# Patient Record
Sex: MALE | Race: BLACK OR AFRICAN AMERICAN | NOT HISPANIC OR LATINO | URBAN - METROPOLITAN AREA
[De-identification: names, ages, dates, MRNs, and addresses within clinical notes are randomized per-mention and may not be internally consistent; named-entity substitution may affect disease eponyms.]

---

## 2017-03-26 ENCOUNTER — EMERGENCY (EMERGENCY)
Facility: HOSPITAL | Age: 52
LOS: 1 days | Discharge: HOME | End: 2017-03-26
Admitting: EMERGENCY MEDICINE
Payer: MEDICARE

## 2017-03-26 VITALS
TEMPERATURE: 99 F | OXYGEN SATURATION: 100 % | SYSTOLIC BLOOD PRESSURE: 116 MMHG | RESPIRATION RATE: 18 BRPM | HEART RATE: 88 BPM | DIASTOLIC BLOOD PRESSURE: 73 MMHG

## 2017-03-26 DIAGNOSIS — R69 ILLNESS, UNSPECIFIED: ICD-10-CM

## 2017-03-26 DIAGNOSIS — F20.0 PARANOID SCHIZOPHRENIA: ICD-10-CM

## 2017-03-26 LAB
ALBUMIN SERPL ELPH-MCNC: 4 G/DL — SIGNIFICANT CHANGE UP (ref 3.3–5)
ALP SERPL-CCNC: 61 U/L — SIGNIFICANT CHANGE UP (ref 40–120)
ALT FLD-CCNC: 18 U/L — SIGNIFICANT CHANGE UP (ref 4–41)
AMPHET UR-MCNC: NEGATIVE — SIGNIFICANT CHANGE UP
APAP SERPL-MCNC: < 15 UG/ML — LOW (ref 15–25)
APPEARANCE UR: CLEAR — SIGNIFICANT CHANGE UP
AST SERPL-CCNC: 32 U/L — SIGNIFICANT CHANGE UP (ref 4–40)
BARBITURATES MEASUREMENT: NEGATIVE — SIGNIFICANT CHANGE UP
BARBITURATES UR SCN-MCNC: NEGATIVE — SIGNIFICANT CHANGE UP
BASOPHILS # BLD AUTO: 0.01 K/UL — SIGNIFICANT CHANGE UP (ref 0–0.2)
BASOPHILS NFR BLD AUTO: 0.2 % — SIGNIFICANT CHANGE UP (ref 0–2)
BENZODIAZ SERPL-MCNC: NEGATIVE — SIGNIFICANT CHANGE UP
BENZODIAZ UR-MCNC: NEGATIVE — SIGNIFICANT CHANGE UP
BILIRUB SERPL-MCNC: < 0.2 MG/DL — LOW (ref 0.2–1.2)
BILIRUB UR-MCNC: NEGATIVE — SIGNIFICANT CHANGE UP
BLOOD UR QL VISUAL: NEGATIVE — SIGNIFICANT CHANGE UP
BUN SERPL-MCNC: 14 MG/DL — SIGNIFICANT CHANGE UP (ref 7–23)
CALCIUM SERPL-MCNC: 9.2 MG/DL — SIGNIFICANT CHANGE UP (ref 8.4–10.5)
CANNABINOIDS UR-MCNC: POSITIVE — SIGNIFICANT CHANGE UP
CHLORIDE SERPL-SCNC: 99 MMOL/L — SIGNIFICANT CHANGE UP (ref 98–107)
CO2 SERPL-SCNC: 23 MMOL/L — SIGNIFICANT CHANGE UP (ref 22–31)
COCAINE METAB.OTHER UR-MCNC: NEGATIVE — SIGNIFICANT CHANGE UP
COLOR SPEC: SIGNIFICANT CHANGE UP
CREAT SERPL-MCNC: 1.18 MG/DL — SIGNIFICANT CHANGE UP (ref 0.5–1.3)
EOSINOPHIL # BLD AUTO: 0.14 K/UL — SIGNIFICANT CHANGE UP (ref 0–0.5)
EOSINOPHIL NFR BLD AUTO: 2.6 % — SIGNIFICANT CHANGE UP (ref 0–6)
ETHANOL BLD-MCNC: < 10 MG/DL — SIGNIFICANT CHANGE UP
GLUCOSE SERPL-MCNC: 106 MG/DL — HIGH (ref 70–99)
GLUCOSE UR-MCNC: NEGATIVE — SIGNIFICANT CHANGE UP
HCT VFR BLD CALC: 37.6 % — LOW (ref 39–50)
HGB BLD-MCNC: 13 G/DL — SIGNIFICANT CHANGE UP (ref 13–17)
IMM GRANULOCYTES NFR BLD AUTO: 0.2 % — SIGNIFICANT CHANGE UP (ref 0–1.5)
KETONES UR-MCNC: NEGATIVE — SIGNIFICANT CHANGE UP
LEUKOCYTE ESTERASE UR-ACNC: NEGATIVE — SIGNIFICANT CHANGE UP
LITHIUM SERPL-MCNC: < 0.04 MMOL/L — LOW (ref 0.6–1.2)
LYMPHOCYTES # BLD AUTO: 1.17 K/UL — SIGNIFICANT CHANGE UP (ref 1–3.3)
LYMPHOCYTES # BLD AUTO: 21.8 % — SIGNIFICANT CHANGE UP (ref 13–44)
MCHC RBC-ENTMCNC: 31.4 PG — SIGNIFICANT CHANGE UP (ref 27–34)
MCHC RBC-ENTMCNC: 34.6 % — SIGNIFICANT CHANGE UP (ref 32–36)
MCV RBC AUTO: 90.8 FL — SIGNIFICANT CHANGE UP (ref 80–100)
METHADONE UR-MCNC: NEGATIVE — SIGNIFICANT CHANGE UP
MONOCYTES # BLD AUTO: 0.24 K/UL — SIGNIFICANT CHANGE UP (ref 0–0.9)
MONOCYTES NFR BLD AUTO: 4.5 % — SIGNIFICANT CHANGE UP (ref 2–14)
NEUTROPHILS # BLD AUTO: 3.8 K/UL — SIGNIFICANT CHANGE UP (ref 1.8–7.4)
NEUTROPHILS NFR BLD AUTO: 70.7 % — SIGNIFICANT CHANGE UP (ref 43–77)
NITRITE UR-MCNC: NEGATIVE — SIGNIFICANT CHANGE UP
OPIATES UR-MCNC: NEGATIVE — SIGNIFICANT CHANGE UP
OXYCODONE UR-MCNC: NEGATIVE — SIGNIFICANT CHANGE UP
PCP UR-MCNC: NEGATIVE — SIGNIFICANT CHANGE UP
PH UR: 6.5 — SIGNIFICANT CHANGE UP (ref 4.6–8)
PLATELET # BLD AUTO: 267 K/UL — SIGNIFICANT CHANGE UP (ref 150–400)
PMV BLD: 9.5 FL — SIGNIFICANT CHANGE UP (ref 7–13)
POTASSIUM SERPL-MCNC: 4.5 MMOL/L — SIGNIFICANT CHANGE UP (ref 3.5–5.3)
POTASSIUM SERPL-SCNC: 4.5 MMOL/L — SIGNIFICANT CHANGE UP (ref 3.5–5.3)
PROT SERPL-MCNC: 6.9 G/DL — SIGNIFICANT CHANGE UP (ref 6–8.3)
PROT UR-MCNC: NEGATIVE — SIGNIFICANT CHANGE UP
RBC # BLD: 4.14 M/UL — LOW (ref 4.2–5.8)
RBC # FLD: 13.7 % — SIGNIFICANT CHANGE UP (ref 10.3–14.5)
RBC CASTS # UR COMP ASSIST: SIGNIFICANT CHANGE UP (ref 0–?)
SALICYLATES SERPL-MCNC: < 5 MG/DL — LOW (ref 15–30)
SODIUM SERPL-SCNC: 138 MMOL/L — SIGNIFICANT CHANGE UP (ref 135–145)
SP GR SPEC: 1.01 — SIGNIFICANT CHANGE UP (ref 1–1.03)
SQUAMOUS # UR AUTO: SIGNIFICANT CHANGE UP
TSH SERPL-MCNC: 0.39 UIU/ML — SIGNIFICANT CHANGE UP (ref 0.27–4.2)
UROBILINOGEN FLD QL: NORMAL E.U. — SIGNIFICANT CHANGE UP (ref 0.1–0.2)
WBC # BLD: 5.37 K/UL — SIGNIFICANT CHANGE UP (ref 3.8–10.5)
WBC # FLD AUTO: 5.37 K/UL — SIGNIFICANT CHANGE UP (ref 3.8–10.5)
WBC UR QL: SIGNIFICANT CHANGE UP (ref 0–?)

## 2017-03-26 PROCEDURE — 90792 PSYCH DIAG EVAL W/MED SRVCS: CPT

## 2017-03-26 PROCEDURE — 99284 EMERGENCY DEPT VISIT MOD MDM: CPT

## 2017-03-26 RX ORDER — IBUPROFEN 200 MG
400 TABLET ORAL ONCE
Qty: 0 | Refills: 0 | Status: COMPLETED | OUTPATIENT
Start: 2017-03-26 | End: 2017-03-26

## 2017-03-26 RX ORDER — BENZTROPINE MESYLATE 1 MG
1 TABLET ORAL ONCE
Qty: 0 | Refills: 0 | Status: COMPLETED | OUTPATIENT
Start: 2017-03-26 | End: 2017-03-26

## 2017-03-26 RX ADMIN — Medication 1 MILLIGRAM(S): at 22:37

## 2017-03-26 RX ADMIN — Medication 400 MILLIGRAM(S): at 20:17

## 2017-03-26 RX ADMIN — Medication 400 MILLIGRAM(S): at 20:24

## 2017-03-26 NOTE — ED BEHAVIORAL HEALTH ASSESSMENT NOTE - DESCRIPTION
Patient was calm and cooperative in the ED and did not exhibit any aggression. Patient did not require any PRN medications or any physical restraints. Hypertension HPI Patient was calm and cooperative in the ED and did not exhibit any aggression. Patient did not require any physical restraints.

## 2017-03-26 NOTE — ED ADULT NURSE REASSESSMENT NOTE - NS ED NURSE REASSESS COMMENT FT1
pt remains awake, a&ox3, appears slightly anxious and states "I need my psych meds, I feel weird". NP Sania made aware, pt ordered to received STAT Cogentin 1mg PO. Pending psychiatric disposition, will continue to monitor for safety and therapeutic effect.

## 2017-03-26 NOTE — ED PROVIDER NOTE - OBJECTIVE STATEMENT
52 y/o M hx DM,HTN, Anxiety Disorder, Paranoid Schizophrenia BIBA w c/o agitation and  mood  swings stating " I need to change my medication". States that he as thrown up twice today and strongly believes that  he might have tape worms. Presently denies N/V at presents.  Denies falling.  Denies punching or   kicking any objects.  Denies pain,   SOB, fever, chills, chest/abdominal discomfort.  Denies SI/HI/AH/VH. Denies recent use of alcohol or illicit drugs.

## 2017-03-26 NOTE — ED BEHAVIORAL HEALTH ASSESSMENT NOTE - HPI (INCLUDE ILLNESS QUALITY, SEVERITY, DURATION, TIMING, CONTEXT, MODIFYING FACTORS, ASSOCIATED SIGNS AND SYMPTOMS)
Patient is a 51 year old single  male, with no caregiver role, domiciled with peers at \[facility]\"" supportive housing, with PPDx of Schizophrenia, paranoid type, with prior psychiatric hospitalization (last: years ago), with no prior self-injurious behavior, with no prior suicidality with intent/plan, with no prior suicide attempt, with occassional marijuana use, with no prior withdrawals/seizures/DTs/complications, with no prior substance use treatment, with prior legal history (felony), with no prior physical/sexual abuse, with no access to guns, with PMHx significant for hypertension, with current treatment at [facility], was was self-referred for medication change. Patient is a 51 year old single  male, with no caregiver role, domiciled with peers at [facility] supportive housing, with PPDx of Schizophrenia, paranoid type, with prior psychiatric hospitalization (last: years ago), with no prior self-injurious behavior, with no prior suicidality with intent/plan, with no prior suicide attempt, with occassional marijuana use, with no prior withdrawals/seizures/DTs/complications, with no prior substance use treatment, with prior legal history (felony), with no prior physical/sexual abuse, with no access to guns, with PMHx significant for hypertension, with current treatment at [facility], was was self-referred for medication change.    Patient presents organized, however with impaired reasoning, stating to have came to ED because he feels his medications are not working as he has become "immune to them." Patient complaining of EPS like symptoms, reporting psychomotor restlessness. Reports irritability and feeling on edge. however patient is not agitated. Reports difficulty sleeping. Denies manic symptoms including elevated mood, mood lability, distractibility, grandiosity, pressured speech, increase in goal-directed activity, or decreased need for sleep. Denies psychotic symptoms including paranoia, ideas of reference, thought insertion/broadcasting, or auditory/visual/olfactory/tactile/gustatory hallucinations. Denies anxiety/depression. Denies suicidal ideation/intent/plan. Denies homicidal ideation/intent/plan. Denies assault ideation/intent/plan. Reports medication compliance. Reports positive therapeutic relationships and strong social supports. Reports future orientation, with motivation to continue outpatient treatment. Reports having appointment with psychiatrist tomorrow at [facility]. Reports wanting a medication to address his symptoms as reported above. Patient denied being on cogentin. Patient given Cogentin 1 mg in ED.     Collateral was unavailable. Patient is a 51 year old single  male, with no caregiver role, domiciled with peers at [facility] supportive housing, with PPDx of Schizophrenia, paranoid type, with prior psychiatric hospitalization (last: years ago), with no prior self-injurious behavior, with no prior suicidality with intent/plan, with no prior suicide attempt, with occasional marijuana use, with no prior withdrawals/seizures/DTs/complications, with no prior substance use treatment, with prior legal history (felony), with no prior physical/sexual abuse, with no access to guns, with PMHx significant for hypertension, with current treatment at [facility], was self-referred for medication change.    Patient presents organized, however with impaired reasoning, stating to have came to ED because he feels his medications are not working as he has become "immune to them." Patient complaining of EPS like symptoms, reporting psychomotor restlessness. Reports irritability and feeling on edge. however patient is not agitated. Reports difficulty sleeping. Denies manic symptoms including elevated mood, mood lability, distractibility, grandiosity, pressured speech, increase in goal-directed activity, or decreased need for sleep. Denies psychotic symptoms including paranoia, ideas of reference, thought insertion/broadcasting, or auditory/visual/olfactory/tactile/gustatory hallucinations. Denies anxiety/depression. Denies suicidal ideation/intent/plan. Denies homicidal ideation/intent/plan. Denies assault ideation/intent/plan. Reports medication compliance. Reports positive therapeutic relationships and strong social supports. Reports future orientation, with motivation to continue outpatient treatment. Reports having appointment with psychiatrist tomorrow at [facility]. Reports wanting a medication to address his symptoms as reported above. Patient denied being on cogentin. Patient given Cogentin 1 mg in ED.     Collateral was unavailable.

## 2017-03-26 NOTE — ED BEHAVIORAL HEALTH ASSESSMENT NOTE - SUMMARY
Patient is a 51 year old single  male, with no caregiver role, domiciled with peers at [facility] supportive housing, with PPDx of Schizophrenia, paranoid type, with prior psychiatric hospitalization (last: years ago), with no prior self-injurious behavior, with no prior suicidality with intent/plan, with no prior suicide attempt, with occassional marijuana use, with no prior withdrawals/seizures/DTs/complications, with no prior substance use treatment, with prior legal history (felony), with no prior physical/sexual abuse, with no access to guns, with PMHx significant for hypertension, with current treatment at [facility], was was self-referred for medication change.    Patient complaining of his medications are not working along with EPS-like symptoms, reporting psychomotor restlessness. Patient reports irritability and feeling on edge, however patient is not agitated. Reports difficulty sleeping. Patient denying other manic symptoms, including  psychosis, depression and anxiety. Patient denied being on cogentin, which if it is true, may be contributing to his side effect experience. Patient given cogentin in ED. Patient denying suicidality/homicidality. Patient is due for Haldol Decanoate tomorrow. Patient is not presenting as an imminent risk for harm to self, and does not meet criteria for involuntary in-patient hospitalization. Patient and mother agreeable to discharge plan, and engaged in safety planning. Patient to follow-up with out-patient provider at [facility] tomorrow. Patient is a 51 year old single  male, with no caregiver role, domiciled with peers at [facility] supportive housing, with PPDx of Schizophrenia, paranoid type, with prior psychiatric hospitalization (last: years ago), with no prior self-injurious behavior, with no prior suicidality with intent/plan, with no prior suicide attempt, with occasional marijuana use, with no prior withdrawals/seizures/DTs/complications, with no prior substance use treatment, with prior legal history (felony), with no prior physical/sexual abuse, with no access to guns, with PMHx significant for hypertension, with current treatment at [facility], was self-referred for medication change.    Patient complaining of his medications are not working along with EPS-like symptoms, reporting psychomotor restlessness. Patient reports irritability and feeling on edge, however patient is not agitated. Reports difficulty sleeping. Patient denying other manic symptoms, including  psychosis, depression and anxiety. Patient denied being on cogentin, which if it is true, may be contributing to his side effect experience. Patient given cogentin in ED. Patient denying suicidality/homicidality. Patient is due for Haldol Decanoate tomorrow. Patient is not presenting as an imminent risk for harm to self, and does not meet criteria for involuntary in-patient hospitalization. Patient and mother agreeable to discharge plan, and engaged in safety planning. Patient to follow-up with out-patient provider at [facility] tomorrow.

## 2017-03-26 NOTE — ED PROVIDER NOTE - MEDICAL DECISION MAKING DETAILS
52 y/o M hx DM,HTN, Anxiety Disorder, Paranoid Schizophrenia  Labs, Urine Tox/UA, EKG. No evidence of physical injuries, broken skin or deformities. PO challenge. Tolerated full meal well. No c/o abdominal discomfort /N/V.   Medical evaluation performed. There is no clinical evidence of intoxication or any acute medical problem requiring immediate intervention. Patient is awaiting psychiatric consultation. Final disposition will be determined by psychiatrist. 50 y/o M hx DM,HTN, Anxiety Disorder, Paranoid Schizophrenia  Labs, Urine Tox/UA, EKG. No evidence of physical injuries, broken skin or deformities. PO challenge. Tolerated full meal well. No c/o abdominal discomfort /N/V.   Medical evaluation performed. There is no clinical evidence of intoxication or any acute medical problem requiring immediate intervention. Patient is awaiting psychiatric consultation. Final disposition will be determined by psychiatrist..

## 2017-03-26 NOTE — ED BEHAVIORAL HEALTH ASSESSMENT NOTE - REFERRAL / APPOINTMENT DETAILS
Patient to follow-up with outpatient psychiatrist as per scheduled appointment tomorrow at [facility]

## 2017-03-26 NOTE — ED BEHAVIORAL HEALTH ASSESSMENT NOTE - RISK ASSESSMENT
Patient presents as a low risk for harm to self/others, with risk factors including history of violence / legal problems, history of schizophrenia, paranoid type, and difficulty sleeping, of which are outweighed by significant protective factors, including no previous suicide attempts, no access to firearms, positive therapeutic relationships, supportive family and social supports, willingness to seek help, no suicidal/homicidal ideations intent or plans, hopefulness for future, ability to cope with stress,  frustration tolerance, engaging in discharge and safety planning, motivation to participate in outpatient treatment.

## 2017-03-26 NOTE — ED ADULT TRIAGE NOTE - CHIEF COMPLAINT QUOTE
Patient brought from home by EMS for headaches for three days. He is concerned that his antipsychotic meds are not working properly and that it is causing him tremors and headaches.

## 2017-03-26 NOTE — ED BEHAVIORAL HEALTH ASSESSMENT NOTE - DETAILS
See HPI for additional information EPS complaining of headache low psychomotor restlessness Patient present

## 2017-03-26 NOTE — ED BEHAVIORAL HEALTH ASSESSMENT NOTE - SUICIDE PROTECTIVE FACTORS
Future oriented/Supportive social network or family/Positive therapeutic relationships/Responsibility to family and others

## 2017-03-26 NOTE — ED BEHAVIORAL HEALTH ASSESSMENT NOTE - CASE SUMMARY
pt seen and examined. In summary this a 51 year old single  male, with no caregiver role, domiciled with peers at [facility] supportive housing, with PPDx of Schizophrenia, paranoid type, with prior psychiatric hospitalization (last: years ago), with no prior self-injurious behavior, with no prior suicidality with intent/plan, with no prior suicide attempt, with occasional marijuana use, with no prior withdrawals/seizures/DTs/complications, with no prior substance use treatment, with prior legal history (felony), with no prior physical/sexual abuse, with no access to guns, with PMHx significant for hypertension, with current treatment at [facility], was self-referred for medication change. On evaluation the pt is calm and cooperative. he denies acute mood or psychotic symtpoms. he reports having followup tomorrow with his psychiatrist. He reports that he his tremor is worse and would like some medication. pt was given cogentin with good effect and instructed to follow up with his psychiatrist. In my medical opinion the pt is not an acute risk of harm to self or others and does not meet criteria for psychiatric hospitalization.

## 2017-03-27 RX ADMIN — Medication 400 MILLIGRAM(S): at 00:17

## 2017-03-27 RX ADMIN — Medication 400 MILLIGRAM(S): at 00:43

## 2017-03-27 NOTE — ED ADULT NURSE REASSESSMENT NOTE - GENERAL PATIENT STATE
pt remains awake, denies s/i h/i with + effect from PO meds received/comfortable appearance/cooperative

## 2017-10-09 ENCOUNTER — EMERGENCY (EMERGENCY)
Facility: HOSPITAL | Age: 52
LOS: 1 days | Discharge: HOME | End: 2017-10-09
Attending: EMERGENCY MEDICINE | Admitting: EMERGENCY MEDICINE
Payer: MEDICARE

## 2017-10-09 VITALS
HEART RATE: 84 BPM | DIASTOLIC BLOOD PRESSURE: 75 MMHG | TEMPERATURE: 98 F | OXYGEN SATURATION: 100 % | RESPIRATION RATE: 16 BRPM | SYSTOLIC BLOOD PRESSURE: 128 MMHG

## 2017-10-09 PROCEDURE — 99283 EMERGENCY DEPT VISIT LOW MDM: CPT | Mod: 25

## 2017-10-09 PROCEDURE — 90792 PSYCH DIAG EVAL W/MED SRVCS: CPT | Mod: GC

## 2017-10-09 RX ORDER — DIPHENHYDRAMINE HCL 50 MG
50 CAPSULE ORAL ONCE
Qty: 0 | Refills: 0 | Status: COMPLETED | OUTPATIENT
Start: 2017-10-09 | End: 2017-10-09

## 2017-10-09 RX ORDER — LISINOPRIL 2.5 MG/1
10 TABLET ORAL DAILY
Qty: 0 | Refills: 0 | Status: DISCONTINUED | OUTPATIENT
Start: 2017-10-09 | End: 2017-10-13

## 2017-10-09 RX ORDER — HALOPERIDOL DECANOATE 100 MG/ML
10 INJECTION INTRAMUSCULAR ONCE
Qty: 0 | Refills: 0 | Status: COMPLETED | OUTPATIENT
Start: 2017-10-09 | End: 2017-10-09

## 2017-10-09 RX ORDER — METFORMIN HYDROCHLORIDE 850 MG/1
1000 TABLET ORAL ONCE
Qty: 0 | Refills: 0 | Status: COMPLETED | OUTPATIENT
Start: 2017-10-09 | End: 2017-10-09

## 2017-10-09 RX ORDER — HALOPERIDOL DECANOATE 100 MG/ML
5 INJECTION INTRAMUSCULAR ONCE
Qty: 0 | Refills: 0 | Status: COMPLETED | OUTPATIENT
Start: 2017-10-09 | End: 2017-10-09

## 2017-10-09 RX ADMIN — Medication 50 MILLIGRAM(S): at 08:31

## 2017-10-09 RX ADMIN — LISINOPRIL 10 MILLIGRAM(S): 2.5 TABLET ORAL at 08:31

## 2017-10-09 RX ADMIN — HALOPERIDOL DECANOATE 5 MILLIGRAM(S): 100 INJECTION INTRAMUSCULAR at 07:14

## 2017-10-09 RX ADMIN — METFORMIN HYDROCHLORIDE 1000 MILLIGRAM(S): 850 TABLET ORAL at 08:32

## 2017-10-09 RX ADMIN — HALOPERIDOL DECANOATE 10 MILLIGRAM(S): 100 INJECTION INTRAMUSCULAR at 08:31

## 2017-10-09 NOTE — ED PROVIDER NOTE - OBJECTIVE STATEMENT
50 yo with reported shizo not taking his meds for the last 3 days - reports needing help and hearing voices.  Denies any ingestions.  States he can get meds in 3 days.

## 2017-10-09 NOTE — ED BEHAVIORAL HEALTH ASSESSMENT NOTE - REFERRAL / APPOINTMENT DETAILS
Patient to follow-up with outpatient psychiatrist as per scheduled appointment tomorrow at [facility] Patient to follow-up with outpatient psychiatrist as per scheduled appointment

## 2017-10-09 NOTE — ED ADULT NURSE REASSESSMENT NOTE - NS ED NURSE REASSESS COMMENT FT1
Received pt in  pt restless denies Si/Hi/AVh at present eval on going. Received report from RN ARSALAN pt restless c/o insomnia x several months pt denies Si/Hi/AVh at present eval on going.

## 2017-10-09 NOTE — ED ADULT TRIAGE NOTE - CHIEF COMPLAINT QUOTE
Pt. from home with c/o hearing voices" chattering in my ear" stated my mind is racing and unable to sleep. Denies SI. stated he ran out of medications.

## 2017-10-09 NOTE — ED BEHAVIORAL HEALTH ASSESSMENT NOTE - SAFETY PLAN DETAILS
Patient instructed to return to the ED or call 911 if patient experiences SI, HI, hopelessness, worsening of symptoms or has any other concerns. Patient instructed to return to the ED or call 911 if patient experiences SI, HI, hopelessness, worsening of symptoms or has any other acute concerns.

## 2017-10-09 NOTE — ED ADULT NURSE NOTE - OBJECTIVE STATEMENT
Pt received to #2. Pt states he ran out of his Rx for Haldol, Ambien and Klonopin and is now experiencing auditory hallucinations of some unknown person chatting in his ear. Pt denies SI/HI. Pt extremely fidgety, pacing back and forth. Psych Attending made aware and medication administered as ordered. Pt belongings secured for safety. Pt awaiting psychiatric evaluation.

## 2017-10-09 NOTE — ED BEHAVIORAL HEALTH ASSESSMENT NOTE - DESCRIPTION
Patient was initially agitated and was given Haldol 5 po PRN at 7:14am with moderate effect. He then received his home medications at 8:31am (Haldol po 10, Benadryl po 50 with good effect, and his home anti-HTN and DM2 medications. Hypertension HPI Patient was initially agitated and was given Haldol 5 po PRN at 7:14am with moderate effect. He then received Haldol po 10 (home medication), Benadryl po 50 with good effect, and his home anti-HTN and DM2 medications.

## 2017-10-09 NOTE — ED BEHAVIORAL HEALTH ASSESSMENT NOTE - DETAILS
Patient present Pt did not provide details but says he had one suicide attempt when he was very young 1 arrest for assault in past EPS low psychomotor restlessness complaining of headache self-referred

## 2017-10-09 NOTE — ED BEHAVIORAL HEALTH ASSESSMENT NOTE - CASE SUMMARY
52 y/o male with history of Schizophrenia, daily marijuana use, self-referred to  ED as he ran out of medications.   Patient complaining of worsening symptoms (AH, depression, insomnia, and racing thoughts) secondary to running out of medications. He received his Haldol Decanoate on 10/5, but was unable to fill prescription for Klonopin and po Haldol due to issues with copay. Patient denying suicidality/homicidality. Patient is not presenting as an imminent risk for harm to self, and does not meet criteria for involuntary in-patient hospitalization. Patient was unable to fill prescription for Klonopin and Haldol because it was sent to pharmacy which was charging him a co-pay. Patient's  spoke to psychiatrist who is going to send medication to new pharmacy, which will be delivered to patient's apartment. This was communicated to patient, who will stay home until his medication is delivered and will call his  when he is discharged. Patient agreeable to discharge plan, and engaged in safety planning. Patient will follow up with his outpatient psychiatrist at his regularly scheduled appointment.

## 2017-10-09 NOTE — ED BEHAVIORAL HEALTH ASSESSMENT NOTE - RISK ASSESSMENT
Patient presents as a low risk for harm to self/others, with risk factors including history of violence / legal problems, history of schizophrenia, paranoid type, and difficulty sleeping, of which are outweighed by significant protective factors, including no previous suicide attempts, no access to firearms, positive therapeutic relationships, supportive family and social supports, willingness to seek help, no suicidal/homicidal ideations intent or plans, hopefulness for future, ability to cope with stress,  frustration tolerance, engaging in discharge and safety planning, motivation to participate in outpatient treatment. Patient presents as a low risk for harm to self/others, with risk factors including history of violence / legal problems, history of schizophrenia, and insomnia, of which are outweighed by significant protective factors, including no S/H/I/I/P, no access to firearms, positive therapeutic relationships, supportive family and social supports, willingness to seek help, no hopefulness for future, future orientation, engaging in discharge and safety planning, motivation to participate in outpatient treatment. Patient currently presents as a low risk for harm to self/others, with chronic risk factors including history of violence / legal problems, history of schizophrenia, and insomnia, of which are outweighed by significant protective factors, including no S/H/I/I/P, no access to firearms, positive therapeutic relationships, supportive family and social supports, willingness to seek help, no hopefulness for future, future orientation, engaging in discharge and safety planning, motivation to participate in outpatient treatment.

## 2017-10-09 NOTE — ED BEHAVIORAL HEALTH ASSESSMENT NOTE - MEDICATIONS (PRESCRIPTIONS, DIRECTIONS)
Patient may continue medications as prescribed by outpatient psychiatrist. Continue medications as prescribed by outpatient psychiatrist.

## 2017-10-09 NOTE — ED BEHAVIORAL HEALTH ASSESSMENT NOTE - SUMMARY
Patient is a 51 year old single  male, with no caregiver role, domiciled with peers at [facility] supportive housing, with PPDx of Schizophrenia, paranoid type, with prior psychiatric hospitalization (last: years ago), with no prior self-injurious behavior, with no prior suicidality with intent/plan, with no prior suicide attempt, with occasional marijuana use, with no prior withdrawals/seizures/DTs/complications, with no prior substance use treatment, with prior legal history (felony), with no prior physical/sexual abuse, with no access to guns, with PMHx significant for hypertension, with current treatment at [facility], was self-referred for medication change.    Patient complaining of his medications are not working along with EPS-like symptoms, reporting psychomotor restlessness. Patient reports irritability and feeling on edge, however patient is not agitated. Reports difficulty sleeping. Patient denying other manic symptoms, including  psychosis, depression and anxiety. Patient denied being on cogentin, which if it is true, may be contributing to his side effect experience. Patient given cogentin in ED. Patient denying suicidality/homicidality. Patient is due for Haldol Decanoate tomorrow. Patient is not presenting as an imminent risk for harm to self, and does not meet criteria for involuntary in-patient hospitalization. Patient and mother agreeable to discharge plan, and engaged in safety planning. Patient to follow-up with out-patient provider at [facility] tomorrow. Patient is a 51 year old single  male, no caregiver role, domiciled with roommate at [facility] supportive housing, with PPDx of Schizophrenia, paranoid type, with prior psychiatric hospitalization (last psych hospitalization "years ago"), with no prior self-injurious behavior, with1 prior suicide attempt "when I was very young", daily marijuana use, with no prior withdrawals/seizures/DTs/complications, with no prior substance use treatment, with prior legal history (felony for assault many years ago), with no prior physical/sexual abuse, with no access to guns, with PMHx significant for hypertension and DM2, with current treatment at [facility] (Dr. [phone]), was self-referred as he ran out of medications.     Patient complaining of worsening symptoms (AH, depression, insomnia, and racing thoughts) secondary to running out of medications. He received his Haldol Decanoate on 10/5, but was unable to fill prescription for Klonopin and po Haldol due to issues with copay. Patient denying suicidality/homicidality. Patient is not presenting as an imminent risk for harm to self, and does not meet criteria for involuntary in-patient hospitalization. Patient was unable to fill prescription for Klonopin and Haldol because it was sent to pharmacy which was charging him a copay. Patient's  spoke to MD who is going to send medication to new pharmacy, which patient can obtain today. This was communicated to patient, who will coordinate picking up the medications with his . Patient agreeable to discharge plan, and engaged in safety planning. Patient will follow up with his outpatient psychiatrist at his regularly scheduled appointment. Patient is a 51 year old single  male, no caregiver role, domiciled with roommate at [facility] supportive housing, with PPDx of Schizophrenia, paranoid type, with prior psychiatric hospitalization (last psych hospitalization "years ago"), with no prior self-injurious behavior, with 1 prior suicide attempt "when I was very young", daily marijuana use, with no prior withdrawals/seizures/DTs/complications, with no prior substance use treatment, with prior legal history (felony for assault many years ago), with no access to guns, with PMHx significant for hypertension and DM2, with current treatment at [facility] (Dr. Velasquez ), was self-referred as he ran out of medications.     Patient complaining of worsening symptoms (AH, depression, insomnia, and racing thoughts) secondary to running out of medications. He received his Haldol Decanoate on 10/5, but was unable to fill prescription for Klonopin and po Haldol due to issues with copay. Patient denying suicidality/homicidality. Patient is not presenting as an imminent risk for harm to self, and does not meet criteria for involuntary in-patient hospitalization. Patient was unable to fill prescription for Klonopin and Haldol because it was sent to pharmacy which was charging him a co-pay. Patient's  spoke to psychiatrist who is going to send medication to new pharmacy, which will be delivered to patient's apartment. This was communicated to patient, who will stay home until his medication is delivered and will call his  when he is discharged. Patient agreeable to discharge plan, and engaged in safety planning. Patient will follow up with his outpatient psychiatrist at his regularly scheduled appointment.

## 2017-10-09 NOTE — ED BEHAVIORAL HEALTH ASSESSMENT NOTE - HPI (INCLUDE ILLNESS QUALITY, SEVERITY, DURATION, TIMING, CONTEXT, MODIFYING FACTORS, ASSOCIATED SIGNS AND SYMPTOMS)
Patient is a 51 year old single  male, no caregiver role, domiciled with roommate at [facility] supportive housing, with PPDx of Schizophrenia, paranoid type, with prior psychiatric hospitalization (last psych hospitalization "years ago"), with no prior self-injurious behavior, with1 prior suicide attempt "when I was very young", daily marijuana use, with no prior withdrawals/seizures/DTs/complications, with no prior substance use treatment, with prior legal history (felony for assault many years ago), with no prior physical/sexual abuse, with no access to guns, with PMHx significant for hypertension and DM2, with current treatment at [facility] (Dr. [phone]), was self-referred as he ran out of medications.     Patient seen after receiving Haldol (his regular home dose and PRN) in the ED due to reported agitation. He reports that he ran out of medications on 10/5 and since this time his symptoms are worsening. He says that he goes to the psychiatrist listed above, and received his Haldol Dec 250mg IM on 10/5, but was unable to  his medications because of issues regarding a copay. He subsequently went to [facility] last week because he need a refill on medications, but was unable to get the medications at this time. He states that he is supposed to take Haldol 10mg daily, Klonopin 1mg, and Ambien at night and is currently requesting his klonopin because "it helps me sleep" (per ISTOP patient has not been getting ambien, but gets Rx of Klonopin 1mg po daily from Dr. Velasquez, last prescription 9/12 - 30 day supply). Currently he reports having racing thoughts, restlessness, auditory hallucinations (voices chattering constantly) worse than his baseline AH, denies VH with no evidence of delusions. He also reports feeling depression, with insomnia (says he hasn't slept in 3 days, and has difficulty sleeping at baseline), but he wants to sleep and feels tired. Denies suicidal ideation/intent/plan. Denies violent/homicidal ideation/intent/plan. Endorses increase in anxiety in past few days. Denies manic symptoms including elevated mood, mood lability, distractibility, grandiosity, pressured speech, increase in goal-directed activity, or decreased need for sleep. No access to weapons. Daily marijuana use, denies other substance use. The patient is future oriented, and wants to be in a day program as he wants more structure to his day and wants his treatment team to see him more frequently.     Collateral was obtained from patient's  at [facility]. Per Svitlana the patient is supposed to see the doctor soon and aside from the past week, he doesn't have a history of running out of medications and going to the ED. She is aware he went to [place] last week, unsure why he hasn't been able to fill his Rx. She thinks that maybe he has a new copay and cannot afford it. She will follow up with the MD. She says that Mr. Aguilar has been doing well recently, she doesn't have any concerns for his safety or for the safety of anyone else. He is compliant with medication in the community, and is functioning well. She is currently trying to get him into a day program. Svitlana is going to speak to the doctor's office and will call writer back. Patient is a 51 year old single  male, no caregiver role, domiciled with roommate at [facility] supportive housing, with PPDx of Schizophrenia, with prior psychiatric hospitalization (last psych hospitalization "years ago"), with no prior self-injurious behavior, with 1 prior suicide attempt "when I was very young", daily marijuana use, with no prior withdrawals/seizures/DTs/complications, with no prior substance use treatment, with prior legal history (felony for assault many years ago), with no access to guns, with PMHx significant for hypertension and DM2, with current treatment at [facility] (Dr. Velasquez ), was self-referred as he ran out of medications as he was unable to fill his prescription last week due to issues with copay.     Patient seen after receiving Haldol (his regular home dose and PRN) in the ED due to reported agitation. He reports that he ran out of medications on 10/5 and since this time his symptoms are worsening. He says that he goes to the psychiatrist listed above, and received his Haldol Dec 250mg IM on 10/5, but was unable to  his medications because of issues regarding a copay. He subsequently went to [facility] last week because he need a refill on medications, but was unable to get the medications at this time. He states that he is supposed to take Haldol 10mg daily, Klonopin 1mg, and Ambien at night and is currently requesting his klonopin because "it helps me sleep" (per ISTOP (reference [id number]) patient has not been getting ambien, but gets Rx of Klonopin 1mg po daily from Dr. Velasquez, last prescription 9/12 - 30 day supply). Currently he reports having racing thoughts, restlessness, auditory hallucinations (voices chattering constantly) worse than his baseline AH, denies VH with no evidence of delusions. He also reports feeling depression, with insomnia (says he hasn't slept in 3 days, and has difficulty sleeping at baseline), but he wants to sleep and feels tired. Denies suicidal ideation/intent/plan. Denies violent/homicidal ideation/intent/plan. Endorses increase in anxiety in past few days. Denies manic symptoms including elevated mood, mood lability, distractibility, grandiosity, pressured speech, increase in goal-directed activity, or decreased need for sleep. No access to weapons. Daily marijuana use, denies other substance use. The patient is future oriented, and wants to be in a day program as he wants more structure to his day and wants his treatment team to see him more frequently.     Collateral was obtained from patient's  at [facility] Svitlana . Per Svitlana the patient saw doctor last week (received his Haldol Dec). She says that Mr. Aguilar has been doing well recently, she doesn't have any concerns for his safety or for the safety of anyone else. He is compliant with medication in the community, and is functioning well. She is currently trying to get him into a day program. He doesn't have a history of running out of medications and going to the ED. She is aware he went to [facility] last week for the same reason. She says that the pharmacy he is using is now charging him a copay, so she spoke to the psychiatrist this morning and he will send Rx for Klonopin/Haldol to new pharmacy. The medications will be delivered to the patient's apartment today. Pt will follow up with his  today when he returns to his residence and will follow up with his psychiatrist per routine (next in 3 weeks) for his next Haldol Decanoate injection. Patient is a 51 year old single  male, no caregiver role, domiciled with roommate at [facility] supportive housing, with PPDx of Schizophrenia, with prior psychiatric hospitalization (last psych hospitalization "years ago"), with no prior self-injurious behavior, with 1 prior suicide attempt "when I was very young", daily marijuana use, with no prior withdrawals/seizures/DTs/complications, with no prior substance use treatment, with prior legal history (felony for assault many years ago), with no access to guns, with PMHx significant for hypertension and DM2, with current treatment at [facility] (Dr. Velasquez ), was self-referred as he ran out of medications as he was unable to fill his prescription last week due to issues with copay.     Patient seen after receiving Haldol (his regular home dose and PRN) in the ED due to reported agitation. He reports that he ran out of medications on 10/5 and since this time his symptoms are worsening. He says that he goes to the psychiatrist listed above, and received his Haldol Dec 250mg IM on 10/5, but was unable to  his medications because of issues regarding a copay at his pharmacy. He subsequently went to [facility] last week because he need a refill on medications, but was unable to get the medications at this time. He states that he is supposed to take Haldol 10mg daily, Klonopin 1mg, and Ambien at night and is currently requesting his klonopin because "it helps me sleep" (per ISTOP (reference [id number]) patient has not been getting ambien, but gets Rx of Klonopin 1mg po daily from Dr. Velasquez, last prescription 9/12 - 30 day supply). Currently he reports having racing thoughts, restlessness, auditory hallucinations (voices chattering constantly) worse than his baseline AH, denies VH with no evidence of delusions. He also reports depression, with insomnia (says he hasn't slept in 3 days, and has difficulty sleeping at baseline), but he wants to sleep and feels tired. Denies suicidal ideation/intent/plan. Denies violent/homicidal ideation/intent/plan. Endorses increase in anxiety in past few days. Denies manic symptoms including elevated mood, mood lability, distractibility, grandiosity, pressured speech, increase in goal-directed activity, or decreased need for sleep. No access to weapons. Daily marijuana use, denies other substance use. The patient is future oriented, and wants to be in a day program as he wants more structure to his day and wants his treatment team to see him more frequently.     Collateral was obtained from patient's  at [facility] Svitlana . Per Svitlana the patient saw doctor last week (received his Haldol Dec). She says that Mr. Aguilar has been doing well recently, she doesn't have any concerns for his safety or for the safety of anyone else. He is compliant with medication in the community, and is functioning well. She is currently trying to get him into a day program. He doesn't have a history of running out of medications and going to the ED aside from this week. She is aware he went to [facility] last week for meds. She says that the pharmacy he is using is now charging him a copay, so she spoke to the psychiatrist this morning and psychiatrist will send Rx for Klonopin/Haldol to new pharmacy who will deliver the medication to his apartment today. Pt is aware of this plan. Pt will follow up with his  today when he returns to his residence and will follow up with his psychiatrist per routine for his next Haldol Decanoate injection. Svitlana is also going to reach out to patient's PCP to make sure his metformin and lisinopril are being sent to correct pharmacy. Patient is a 51 year old single  male, no caregiver role, domiciled with roommate at [facility] supportive housing, with PPDx of Schizophrenia, with prior psychiatric hospitalizations (last psych hospitalization "years ago"), with no prior self-injurious behavior, with 1 prior suicide attempt "when I was very young", daily marijuana use, with no prior withdrawals/seizures/DTs/complications, with no prior substance use treatment, with prior legal history (felony for assault many years ago), with no access to guns, with PMHx significant for hypertension and DM2, with current treatment at [facility] (Dr. Velasquez ), was self-referred as he ran out of medications as he was unable to fill his prescription last week due to issues with copay.     Patient seen after receiving Haldol (his regular home dose and PRN) in the ED due to reported agitation. He reports that he ran out of medications on 10/5 and since this time his symptoms are worsening. He says that he goes to the psychiatrist listed above, and received his Haldol Dec 250mg IM on 10/5, but was unable to  his medications because of issues regarding a copay at his pharmacy. He subsequently went to [facility] last week because he needed a refill on medications, but was unable to get the medications at this time. He states that he is supposed to take Haldol 10mg daily, Klonopin 1mg, and Ambien at night and is currently requesting his klonopin because "it helps me sleep" (per ISTOP (reference [id number]) patient has not been getting ambien, but gets Rx of Klonopin 1mg po daily from Dr. Velasquez, last prescription 9/12 - 30 day supply). Currently he reports having racing thoughts, restlessness, auditory hallucinations (voices chattering constantly) worse than his baseline AH, denies VH with no evidence of delusions. He also reports depression, with insomnia (says he hasn't slept in 3 days, and has difficulty sleeping at baseline), but he wants to sleep and feels tired. Denies suicidal ideation/intent/plan. Denies violent/homicidal ideation/intent/plan. Endorses increase in anxiety in past few days. Denies manic symptoms including elevated mood, mood lability, distractibility, grandiosity, pressured speech, increase in goal-directed activity, or decreased need for sleep. No access to weapons. Daily marijuana use, denies other substance use. The patient is future oriented, and wants to be in a day program as he wants more structure to his day and wants his treatment team to see him more frequently.     Collateral was obtained from patient's  at [facility] Svitlana . Per Svitlana the patient saw doctor last week (received his Haldol Dec). She says that Mr. Aguilar has been doing well recently, she doesn't have any concerns for his safety or for the safety of anyone else. He is compliant with medication in the community, and is functioning well. She is currently trying to get him into a day program. He doesn't have a history of running out of medications and going to the ED aside from this week. She is aware he went to [facility] last week for meds. She says that the pharmacy he is using is now charging him a copay, so she spoke to the psychiatrist this morning and psychiatrist will send Rx for Klonopin/Haldol to new pharmacy who will deliver the medication to his apartment today. Pt is aware of this plan. Pt will follow up with his  today when he returns to his residence and will follow up with his psychiatrist per routine for his next Haldol Decanoate injection. Svitlana is also going to reach out to patient's PCP to make sure his metformin and lisinopril are being sent to correct pharmacy.

## 2017-10-09 NOTE — ED BEHAVIORAL HEALTH ASSESSMENT NOTE - SUICIDE PROTECTIVE FACTORS
Supportive social network or family/Responsibility to family and others/Identifies reasons for living/Future oriented/Positive therapeutic relationships

## 2018-03-14 ENCOUNTER — EMERGENCY (EMERGENCY)
Facility: HOSPITAL | Age: 53
LOS: 1 days | Discharge: HOME | End: 2018-03-14
Attending: EMERGENCY MEDICINE | Admitting: EMERGENCY MEDICINE
Payer: MEDICARE

## 2018-03-14 VITALS
SYSTOLIC BLOOD PRESSURE: 111 MMHG | DIASTOLIC BLOOD PRESSURE: 82 MMHG | TEMPERATURE: 98 F | OXYGEN SATURATION: 98 % | RESPIRATION RATE: 17 BRPM | HEART RATE: 100 BPM

## 2018-03-14 VITALS
OXYGEN SATURATION: 98 % | HEART RATE: 72 BPM | RESPIRATION RATE: 16 BRPM | SYSTOLIC BLOOD PRESSURE: 102 MMHG | TEMPERATURE: 99 F | DIASTOLIC BLOOD PRESSURE: 60 MMHG

## 2018-03-14 PROCEDURE — 99283 EMERGENCY DEPT VISIT LOW MDM: CPT

## 2018-03-14 RX ORDER — RABIES VACC, HUMAN DIPLOID/PF 2.5 UNIT
1 VIAL (EA) INTRAMUSCULAR ONCE
Qty: 0 | Refills: 0 | Status: COMPLETED | OUTPATIENT
Start: 2018-03-14 | End: 2018-03-14

## 2018-03-14 RX ORDER — HUMAN RABIES VIRUS IMMUNE GLOBULIN 150 [IU]/ML
1600 INJECTION, SOLUTION INTRAMUSCULAR ONCE
Qty: 0 | Refills: 0 | Status: COMPLETED | OUTPATIENT
Start: 2018-03-14 | End: 2018-03-14

## 2018-03-14 RX ADMIN — Medication 1 MILLILITER(S): at 16:17

## 2018-03-14 RX ADMIN — HUMAN RABIES VIRUS IMMUNE GLOBULIN 1600 UNIT(S): 150 INJECTION, SOLUTION INTRAMUSCULAR at 16:15

## 2018-03-14 NOTE — ED PROVIDER NOTE - OBJECTIVE STATEMENT
51 y/o M w/ PMHx of anxiety, DM, HTN and Paranoid schizophrenia, presents to the ED c/o puncture wounds to left arm s/p bat bite 2-3 days ago. Pt states bats sneak under his door into his house at night and bit his arm while he was sleeping. Denies fever, chills or any other complaints. Tetanus UTD.

## 2018-03-14 NOTE — ED ADULT TRIAGE NOTE - CHIEF COMPLAINT QUOTE
pt states 3 days ago he was bit on his arm by a bat at night. No wounds noted to skin. pt reluctant to answer questions.

## 2018-03-14 NOTE — ED PROVIDER NOTE - SKIN WOUND DESCRIPTION
left forearm w/ 2 questionable puncture marks approximately 2 cm apart, nontender, no exudates, no edema, no open wounds, similar to distal burn wounds from previous injury

## 2018-03-14 NOTE — ED PROVIDER NOTE - NS_ ATTENDINGSCRIBEDETAILS _ED_A_ED_FT
The scribe's documentation has been prepared under my direction and personally reviewed by me in its entirety. I confirm that the note above accurately reflects all work, treatment, procedures, and medical decision making performed by Jose Martin Deng MD

## 2018-03-14 NOTE — ED PROVIDER NOTE - CARE PLAN
Principal Discharge DX:	Bat bite wound Principal Discharge DX:	Bat bite wound  Assessment and plan of treatment:	Rest, drink plenty of fluids.  Advance activity as tolerated.  Return to the Emergency Department in 3 days, 7 days, and 14 days for additional Rabies vaccine injection. Follow up with your primary care physician in 48-72 hours.  Return to the ER for worsening or persistent symptoms, and/or ANY NEW OR CONCERNING SYMPTOMS. If you have issues obtaining follow up, please call: [phone] to obtain a doctor or specialist who takes your insurance in your area.

## 2018-03-14 NOTE — ED PROVIDER NOTE - MEDICAL DECISION MAKING DETAILS
51 y/o M w/ questionable bat bite. Rabies vaccine not UTD. Tetanus UTD. Will give Rabies vaccine today and told to return on day 3, 7 and 14. 53 y/o M w/ questionable bat bite. Rabies vaccine not UTD. Tetanus UTD. Will give Rabies vaccine today and told to return on day 3, 7 and 14.  no evidence of infx at this time, return to ER if worse

## 2018-03-14 NOTE — ED PROVIDER NOTE - PLAN OF CARE
Rest, drink plenty of fluids.  Advance activity as tolerated.  Return to the Emergency Department in 3 days, 7 days, and 14 days for additional Rabies vaccine injection. Follow up with your primary care physician in 48-72 hours.  Return to the ER for worsening or persistent symptoms, and/or ANY NEW OR CONCERNING SYMPTOMS. If you have issues obtaining follow up, please call: [phone] to obtain a doctor or specialist who takes your insurance in your area.

## 2018-03-21 ENCOUNTER — EMERGENCY (EMERGENCY)
Facility: HOSPITAL | Age: 53
LOS: 1 days | Discharge: HOME | End: 2018-03-21
Admitting: EMERGENCY MEDICINE
Payer: MEDICARE

## 2018-03-21 VITALS
OXYGEN SATURATION: 100 % | TEMPERATURE: 98 F | RESPIRATION RATE: 18 BRPM | HEART RATE: 108 BPM | SYSTOLIC BLOOD PRESSURE: 111 MMHG | DIASTOLIC BLOOD PRESSURE: 61 MMHG

## 2018-03-21 PROCEDURE — 99283 EMERGENCY DEPT VISIT LOW MDM: CPT

## 2018-03-21 RX ORDER — IBUPROFEN 200 MG
1 TABLET ORAL
Qty: 9 | Refills: 0 | OUTPATIENT
Start: 2018-03-21 | End: 2018-03-23

## 2018-03-21 RX ORDER — RABIES VACC, HUMAN DIPLOID/PF 2.5 UNIT
1 VIAL (EA) INTRAMUSCULAR ONCE
Qty: 0 | Refills: 0 | Status: COMPLETED | OUTPATIENT
Start: 2018-03-21 | End: 2018-03-21

## 2018-03-21 RX ADMIN — Medication 1 MILLILITER(S): at 10:36

## 2018-03-21 NOTE — ED PROVIDER NOTE - OBJECTIVE STATEMENT
53 y/o M pt with pmhx of DM, HTN, paranoid schizophrenia, anxiety presents to get 3rd rabies vaccine. Pt admits to some pain on the bite side without taking any pain meds. Pt denies any other complaints. 53 y/o M pt with pmhx of DM, HTN, paranoid schizophrenia, anxiety presents to get 3rd rabies vaccine. Pt admits to some pain on the bite side without taking any pain meds. Pt had his 2nd rabies vaccine done at [facility]. Pt denies any other complaints.

## 2018-03-21 NOTE — ED PROVIDER NOTE - PLAN OF CARE
Rest, drink plenty of fluids.  Advance activity as tolerated.  Continue all previously prescribed medications as directed.  Follow up with your Primary Care Provider within 1 week, Return to ed for 4th rabies vaccination on the 28th of March.  Return to the ER for worsening or persistent symptoms, and/or ANY NEW OR CONCERNING SYMPTOMS. If you have issues obtaining follow up, please call: [phone] to obtain a doctor or specialist who takes your insurance in your area.

## 2018-03-21 NOTE — ED PROVIDER NOTE - MEDICAL DECISION MAKING DETAILS
51 y/o M pt presents for 3rd dose of rabies vaccine, no complaints, rabies vaccine, instructions to return on the 3/28 for 4th vaccine

## 2018-03-28 ENCOUNTER — EMERGENCY (EMERGENCY)
Facility: HOSPITAL | Age: 53
LOS: 1 days | Discharge: HOME | End: 2018-03-28
Attending: EMERGENCY MEDICINE | Admitting: EMERGENCY MEDICINE
Payer: MEDICARE

## 2018-03-28 VITALS — TEMPERATURE: 99 F | HEART RATE: 90 BPM

## 2018-03-28 VITALS
HEART RATE: 95 BPM | SYSTOLIC BLOOD PRESSURE: 130 MMHG | RESPIRATION RATE: 16 BRPM | OXYGEN SATURATION: 99 % | TEMPERATURE: 99 F | DIASTOLIC BLOOD PRESSURE: 77 MMHG

## 2018-03-28 PROCEDURE — 99282 EMERGENCY DEPT VISIT SF MDM: CPT

## 2018-03-28 RX ORDER — RABIES VACC, HUMAN DIPLOID/PF 2.5 UNIT
1 VIAL (EA) INTRAMUSCULAR ONCE
Qty: 0 | Refills: 0 | Status: COMPLETED | OUTPATIENT
Start: 2018-03-28 | End: 2018-03-28

## 2018-03-28 RX ADMIN — Medication 1 MILLILITER(S): at 11:15

## 2018-03-28 NOTE — ED PROVIDER NOTE - PROGRESS NOTE DETAILS
latesha garcia: pt given vaccination- no complaints at this time. will dc home with pmd f/u. latesha garcia: pt given vaccination- no complaints at this time. will dc home with pmd f/u.  repeat temp taken right after drinking coffee. afebrile.

## 2018-03-28 NOTE — ED ADULT NURSE REASSESSMENT NOTE - NS ED NURSE REASSESS COMMENT FT1
awake and alert, rabies vaccine given as ordered, awaiting discharge. Patient appears in no distress.

## 2018-03-28 NOTE — ED PROVIDER NOTE - OBJECTIVE STATEMENT
53 y/o M w/ PMhx of DM, HTN, paranoid schizophrenia and anxiety, presents to the ED for last rabies vaccination. Pt has been seen in ED on 3/14/18 s/p bat bite and had rabies vaccination initiated. Pt was informed to follow up on day 3, 7 and 14 for rabies vaccination. Denies fever, chills or any other complaints.

## 2018-03-28 NOTE — ED PROVIDER NOTE - SKIN WOUND DESCRIPTION
multiple healing skin wounds on b/l upper extremities, no signs of infection, not warm, not erythematous

## 2018-05-29 NOTE — ED BEHAVIORAL HEALTH ASSESSMENT NOTE - CURRENT MEDICATION
I have personally seen and examined this patient.  I have fully participated in the care of this patient. I have reviewed all pertinent clinical information, including history, physical exam, plan and the Resident’s note and agree except as noted. Haldol Decanoate (due tomorrow); Lithium

## 2019-01-06 ENCOUNTER — EMERGENCY (EMERGENCY)
Facility: HOSPITAL | Age: 54
LOS: 1 days | Discharge: HOME | End: 2019-01-06
Admitting: EMERGENCY MEDICINE
Payer: MEDICARE

## 2019-01-06 VITALS
OXYGEN SATURATION: 100 % | HEART RATE: 70 BPM | RESPIRATION RATE: 16 BRPM | TEMPERATURE: 98 F | SYSTOLIC BLOOD PRESSURE: 147 MMHG | DIASTOLIC BLOOD PRESSURE: 86 MMHG

## 2019-01-06 PROBLEM — E11.9 TYPE 2 DIABETES MELLITUS WITHOUT COMPLICATIONS: Chronic | Status: ACTIVE | Noted: 2017-03-26

## 2019-01-06 PROBLEM — F20.0 PARANOID SCHIZOPHRENIA: Chronic | Status: ACTIVE | Noted: 2017-03-26

## 2019-01-06 PROBLEM — F41.9 ANXIETY DISORDER, UNSPECIFIED: Chronic | Status: ACTIVE | Noted: 2017-03-26

## 2019-01-06 PROBLEM — I10 ESSENTIAL (PRIMARY) HYPERTENSION: Chronic | Status: ACTIVE | Noted: 2017-03-26

## 2019-01-06 PROCEDURE — 99283 EMERGENCY DEPT VISIT LOW MDM: CPT

## 2019-01-06 RX ORDER — CLONAZEPAM 1 MG
0.5 TABLET ORAL ONCE
Qty: 0 | Refills: 0 | Status: DISCONTINUED | OUTPATIENT
Start: 2019-01-06 | End: 2019-01-06

## 2019-01-06 RX ADMIN — Medication 0.5 MILLIGRAM(S): at 14:55

## 2019-01-06 NOTE — ED PROVIDER NOTE - OBJECTIVE STATEMENT
staying with daughter/staying with family 52 y/o M hx DM,HTN, Anxiety Disorder, Paranoid Schizophrenia [facility] requesting medication coverage- Klonopin 0.5 mg PO X 1 .  Patient  accompanied by NPYD is currently under arresting for stealing,  presents calm and  cooperative. States " I just need my  medication"  Denies falling, punching or   kicking any objects.  Denies pain, dizziness, SOB, fever, chills, chest/abdominal discomfort.  Denies SI/HI/AH/VH. Denies recent use of alcohol or illicit drugs.  FS- [age] y/o M hx DM,HTN, Anxiety Disorder, Paranoid Schizophrenia BIBA from [facility] requesting medication coverage- Klonopin 0.5 mg PO X 1 .  Patient  accompanied by NPYD is currently under arresting for stealing,  presents calm and  cooperative. States " I just need my  medication"  Denies falling, punching or   kicking any objects.  Denies pain, dizziness, SOB, fever, chills, chest/abdominal discomfort.  Denies SI/HI/AH/VH. Denies recent use of alcohol or illicit drugs.  FS- 77

## 2019-01-06 NOTE — ED ADULT TRIAGE NOTE - CHIEF COMPLAINT QUOTE
pt comes to ED by EMS with [facility] pt is under arrest for assault. pt needs his psych meds pt is cleared to go to  with NERIS[facility] as per  NP

## 2019-01-06 NOTE — ED ADULT NURSE NOTE - NSIMPLEMENTINTERV_GEN_ALL_ED
Implemented All Universal Safety Interventions:  [place] to call system. Call bell, personal items and telephone within reach. Instruct patient to call for assistance. Room bathroom lighting operational. Non-slip footwear when patient is off stretcher. Physically safe environment: no spills, clutter or unnecessary equipment. Stretcher in lowest position, wheels locked, appropriate side rails in place.

## 2019-01-06 NOTE — ED PROVIDER NOTE - MEDICAL DECISION MAKING DETAILS
54 y/o M hx DM, HTN, Anxiety Disorder, Paranoid Schizophrenia     Ms Tristen Vences - [phone] who confirmed patient's medication regimen consisting of  Klonopin 0.5 mg PO BID.   advised that patient lives independently and is compliant with his medications.   Medical evaluation performed. There is no clinical evidence of intoxication or any acute medical problem requiring immediate intervention. FS- 77. D/C in care of [facility]. 54 y/o M hx DM, HTN, Anxiety Disorder, Paranoid Schizophrenia     Ms Tristen Vences - [phone]  from [facility]- who confirmed patient's medication regimen consisting of  Klonopin 0.5 mg PO BID.   advised that patient lives independently and is compliant with his medications.   Medical evaluation performed. There is no clinical evidence of intoxication or any acute medical problem requiring immediate intervention. FS- 77. D/C in care of Health system.

## 2022-06-16 NOTE — ED PROVIDER NOTE - CONDUCTED A DETAILED DISCUSSION WITH PATIENT AND/OR GUARDIAN REGARDING, MDM
Rose Cheng schedule CT chest/abdomen/pelvis.  Order entered.  It was approved by Dr. Palomino this AM (given the contrast shortage).  Thanks,  NENA     return to ED if symptoms worsen, persist or questions arise/need for outpatient follow-up

## 2024-09-16 NOTE — ED PROVIDER NOTE - CARE PLAN
Addended by: NAPOLEON SELF on: 9/16/2024 05:59 PM     Modules accepted: Orders    
Principal Discharge DX:	Rabies  Assessment and plan of treatment:	Rest, drink plenty of fluids.  Advance activity as tolerated.  Continue all previously prescribed medications as directed.  Follow up with your Primary Care Provider within 1 week, Return to ed for 4th rabies vaccination on the 28th of March.  Return to the ER for worsening or persistent symptoms, and/or ANY NEW OR CONCERNING SYMPTOMS. If you have issues obtaining follow up, please call: [phone] to obtain a doctor or specialist who takes your insurance in your area.